# Patient Record
Sex: MALE | Race: WHITE | NOT HISPANIC OR LATINO | Employment: FULL TIME | URBAN - METROPOLITAN AREA
[De-identification: names, ages, dates, MRNs, and addresses within clinical notes are randomized per-mention and may not be internally consistent; named-entity substitution may affect disease eponyms.]

---

## 2022-06-21 ENCOUNTER — APPOINTMENT (OUTPATIENT)
Dept: GENERAL RADIOLOGY | Facility: HOSPITAL | Age: 52
End: 2022-06-21

## 2022-06-21 ENCOUNTER — HOSPITAL ENCOUNTER (EMERGENCY)
Facility: HOSPITAL | Age: 52
Discharge: HOME | End: 2022-06-21
Attending: EMERGENCY MEDICINE | Admitting: EMERGENCY MEDICINE

## 2022-06-21 ENCOUNTER — APPOINTMENT (OUTPATIENT)
Dept: CT IMAGING | Facility: HOSPITAL | Age: 52
End: 2022-06-21

## 2022-06-21 VITALS
SYSTOLIC BLOOD PRESSURE: 149 MMHG | HEIGHT: 67 IN | HEART RATE: 106 BPM | BODY MASS INDEX: 25.9 KG/M2 | TEMPERATURE: 98 F | DIASTOLIC BLOOD PRESSURE: 99 MMHG | OXYGEN SATURATION: 95 % | RESPIRATION RATE: 18 BRPM | WEIGHT: 165 LBS

## 2022-06-21 DIAGNOSIS — K62.5 RECTAL BLEEDING: Primary | ICD-10-CM

## 2022-06-21 DIAGNOSIS — E87.1 HYPONATREMIA: ICD-10-CM

## 2022-06-21 DIAGNOSIS — K52.9 CHRONIC DIARRHEA: ICD-10-CM

## 2022-06-21 DIAGNOSIS — K63.9 COLON WALL THICKENING: ICD-10-CM

## 2022-06-21 DIAGNOSIS — R55 NEAR SYNCOPE: ICD-10-CM

## 2022-06-21 LAB
ABO GROUP BLD: NORMAL
ALBUMIN SERPL-MCNC: 4.9 G/DL (ref 3.5–5.2)
ALBUMIN/GLOB SERPL: 2.1 G/DL
ALP SERPL-CCNC: 100 U/L (ref 39–117)
ALT SERPL W P-5'-P-CCNC: 17 U/L (ref 1–41)
ANION GAP SERPL CALCULATED.3IONS-SCNC: 11 MMOL/L (ref 5–15)
AST SERPL-CCNC: 25 U/L (ref 1–40)
BASOPHILS # BLD AUTO: 0.05 10*3/MM3 (ref 0–0.2)
BASOPHILS NFR BLD AUTO: 0.9 % (ref 0–1.5)
BILIRUB SERPL-MCNC: 0.7 MG/DL (ref 0–1.2)
BLD GP AB SCN SERPL QL: NEGATIVE
BUN SERPL-MCNC: 9 MG/DL (ref 6–20)
BUN/CREAT SERPL: 11.4 (ref 7–25)
CALCIUM SPEC-SCNC: 9.7 MG/DL (ref 8.6–10.5)
CHLORIDE SERPL-SCNC: 91 MMOL/L (ref 98–107)
CO2 SERPL-SCNC: 25 MMOL/L (ref 22–29)
CREAT SERPL-MCNC: 0.79 MG/DL (ref 0.76–1.27)
DEPRECATED RDW RBC AUTO: 41.3 FL (ref 37–54)
EGFRCR SERPLBLD CKD-EPI 2021: 106.2 ML/MIN/1.73
EOSINOPHIL # BLD AUTO: 0.13 10*3/MM3 (ref 0–0.4)
EOSINOPHIL NFR BLD AUTO: 2.4 % (ref 0.3–6.2)
ERYTHROCYTE [DISTWIDTH] IN BLOOD BY AUTOMATED COUNT: 12.4 % (ref 12.3–15.4)
GLOBULIN UR ELPH-MCNC: 2.3 GM/DL
GLUCOSE SERPL-MCNC: 97 MG/DL (ref 65–99)
HCT VFR BLD AUTO: 35.3 % (ref 37.5–51)
HGB BLD-MCNC: 12.5 G/DL (ref 13–17.7)
HOLD SPECIMEN: NORMAL
HOLD SPECIMEN: NORMAL
IMM GRANULOCYTES # BLD AUTO: 0.04 10*3/MM3 (ref 0–0.05)
IMM GRANULOCYTES NFR BLD AUTO: 0.7 % (ref 0–0.5)
LYMPHOCYTES # BLD AUTO: 0.64 10*3/MM3 (ref 0.7–3.1)
LYMPHOCYTES NFR BLD AUTO: 12 % (ref 19.6–45.3)
MCH RBC QN AUTO: 32.5 PG (ref 26.6–33)
MCHC RBC AUTO-ENTMCNC: 35.4 G/DL (ref 31.5–35.7)
MCV RBC AUTO: 91.7 FL (ref 79–97)
MONOCYTES # BLD AUTO: 0.8 10*3/MM3 (ref 0.1–0.9)
MONOCYTES NFR BLD AUTO: 15 % (ref 5–12)
NEUTROPHILS NFR BLD AUTO: 3.68 10*3/MM3 (ref 1.7–7)
NEUTROPHILS NFR BLD AUTO: 69 % (ref 42.7–76)
NRBC BLD AUTO-RTO: 0 /100 WBC (ref 0–0.2)
NT-PROBNP SERPL-MCNC: 79.6 PG/ML (ref 0–900)
PLATELET # BLD AUTO: 214 10*3/MM3 (ref 140–450)
PMV BLD AUTO: 8.6 FL (ref 6–12)
POTASSIUM SERPL-SCNC: 4.4 MMOL/L (ref 3.5–5.2)
PROT SERPL-MCNC: 7.2 G/DL (ref 6–8.5)
RBC # BLD AUTO: 3.85 10*6/MM3 (ref 4.14–5.8)
RH BLD: POSITIVE
SODIUM SERPL-SCNC: 127 MMOL/L (ref 136–145)
T&S EXPIRATION DATE: NORMAL
TROPONIN T SERPL-MCNC: <0.01 NG/ML (ref 0–0.03)
WBC NRBC COR # BLD: 5.34 10*3/MM3 (ref 3.4–10.8)
WHOLE BLOOD HOLD COAG: NORMAL
WHOLE BLOOD HOLD SPECIMEN: NORMAL

## 2022-06-21 PROCEDURE — 74177 CT ABD & PELVIS W/CONTRAST: CPT

## 2022-06-21 PROCEDURE — 99282 EMERGENCY DEPT VISIT SF MDM: CPT

## 2022-06-21 PROCEDURE — 93005 ELECTROCARDIOGRAM TRACING: CPT | Performed by: EMERGENCY MEDICINE

## 2022-06-21 PROCEDURE — 86900 BLOOD TYPING SEROLOGIC ABO: CPT

## 2022-06-21 PROCEDURE — 83880 ASSAY OF NATRIURETIC PEPTIDE: CPT | Performed by: EMERGENCY MEDICINE

## 2022-06-21 PROCEDURE — 71046 X-RAY EXAM CHEST 2 VIEWS: CPT

## 2022-06-21 PROCEDURE — 84484 ASSAY OF TROPONIN QUANT: CPT | Performed by: EMERGENCY MEDICINE

## 2022-06-21 PROCEDURE — 86900 BLOOD TYPING SEROLOGIC ABO: CPT | Performed by: EMERGENCY MEDICINE

## 2022-06-21 PROCEDURE — 85025 COMPLETE CBC W/AUTO DIFF WBC: CPT | Performed by: EMERGENCY MEDICINE

## 2022-06-21 PROCEDURE — 86850 RBC ANTIBODY SCREEN: CPT | Performed by: EMERGENCY MEDICINE

## 2022-06-21 PROCEDURE — 86901 BLOOD TYPING SEROLOGIC RH(D): CPT | Performed by: EMERGENCY MEDICINE

## 2022-06-21 PROCEDURE — 36415 COLL VENOUS BLD VENIPUNCTURE: CPT

## 2022-06-21 PROCEDURE — 86901 BLOOD TYPING SEROLOGIC RH(D): CPT

## 2022-06-21 PROCEDURE — 25010000002 IOPAMIDOL 61 % SOLUTION: Performed by: EMERGENCY MEDICINE

## 2022-06-21 PROCEDURE — 80053 COMPREHEN METABOLIC PANEL: CPT | Performed by: EMERGENCY MEDICINE

## 2022-06-21 RX ORDER — SODIUM CHLORIDE 0.9 % (FLUSH) 0.9 %
10 SYRINGE (ML) INJECTION AS NEEDED
Status: DISCONTINUED | OUTPATIENT
Start: 2022-06-21 | End: 2022-06-22 | Stop reason: HOSPADM

## 2022-06-21 RX ADMIN — IOPAMIDOL 85 ML: 612 INJECTION, SOLUTION INTRAVENOUS at 21:29

## 2022-06-22 LAB
HOLD SPECIMEN: NORMAL
QT INTERVAL: 406 MS
QTC INTERVAL: 479 MS

## 2022-06-22 NOTE — ED PROVIDER NOTES
Subjective   Pt is a 52 yo male presenting to ED with complaints of rectal bleeding and syncope. PMHx significant for HTN, Hemorrhoids, chronic diarrhea and Anxiety. Pt reports having some bright red blood in stool for the past 2 days. He reports chronic diarrhea and and known hemorrhoids. He denies pain with bowel movement. He does not take blood thinners. He reports after having his diarrhea earlier today he began to get sweaty and then had a near syncopal episode. He denies full LOC and denies falling or injury. He reports he has chronic chest tightness but denies new or worse than baseline. He denies headache, current dizziness, cough, fever, chills, vomiting or urinary sx. He denies known cardiac disease. He states his PCP at the [facility] follows him and he has been referred for a colonoscopy. He reports issues with recurrent low sodium levels. He reports prior cardiac workup for chest pain / recurrent syncope while having bowel movements with ECHO, holter etc and he reports normal testing. He denies new or change in meds. He denies tobacco, drug or ETOH use.       History provided by:  Patient and medical records      Review of Systems   Constitutional: Positive for diaphoresis. Negative for chills and fever.   HENT: Negative for congestion, sore throat and trouble swallowing.    Eyes: Negative for visual disturbance.   Respiratory: Negative for cough and shortness of breath.    Cardiovascular: Negative for chest pain and leg swelling.   Gastrointestinal: Positive for abdominal pain (earlier with diarrhea, resolved), blood in stool and diarrhea. Negative for constipation, nausea and vomiting.   Genitourinary: Negative for difficulty urinating, dysuria and flank pain.   Musculoskeletal: Negative for arthralgias and back pain.   Skin: Negative.  Negative for rash and wound.   Allergic/Immunologic: Negative.    Neurological: Positive for syncope (near). Negative for dizziness, weakness, numbness and headaches.    Psychiatric/Behavioral: Negative for confusion.   All other systems reviewed and are negative.      No past medical history on file.    Allergies   Allergen Reactions   • Erythromycin Nausea And Vomiting   • Penicillins Hives       No past surgical history on file.    No family history on file.    Social History     Socioeconomic History   • Marital status: Single           Objective   Physical Exam  Vitals and nursing note reviewed.   Constitutional:       Appearance: He is well-developed.   HENT:      Head: Atraumatic.      Nose: Nose normal.   Eyes:      General: Lids are normal.      Conjunctiva/sclera: Conjunctivae normal.      Pupils: Pupils are equal, round, and reactive to light.   Cardiovascular:      Rate and Rhythm: Normal rate and regular rhythm.      Heart sounds: Normal heart sounds.   Pulmonary:      Effort: Pulmonary effort is normal.      Breath sounds: Normal breath sounds.   Abdominal:      General: There is no distension.      Palpations: Abdomen is soft.      Tenderness: There is no abdominal tenderness. There is no guarding or rebound.   Musculoskeletal:         General: No tenderness or deformity. Normal range of motion.      Cervical back: Normal range of motion and neck supple.   Skin:     General: Skin is warm and dry.   Neurological:      Mental Status: He is alert and oriented to person, place, and time.      Sensory: No sensory deficit.   Psychiatric:         Mood and Affect: Mood is anxious.         Speech: Speech normal.         Behavior: Behavior normal.         Procedures           ED Course  ED Course as of 06/21/22 2250   Tue Jun 21, 2022 2201 Hemoglobin(!): 12.5 [RT]   2201 Hematocrit(!): 35.3 [RT]   2201 Sodium(!): 127 [RT]      ED Course User Index  [RT] Jessie Jaime PA      Re-examined patient and discussed results. Na improved from 124 on 6/16 to 127. Discussed CT findings and need for close GI f/u. He reports his PCP has scheduled a colonoscopy but it is not till  August and he is unsure who it is with. Will provide GI on call follow up from ED. Went over new / worse sx to return to ED.     Reviewed old records.     Recent Results (from the past 24 hour(s))   Comprehensive Metabolic Panel    Collection Time: 06/21/22  8:10 PM    Specimen: Blood   Result Value Ref Range    Glucose 97 65 - 99 mg/dL    BUN 9 6 - 20 mg/dL    Creatinine 0.79 0.76 - 1.27 mg/dL    Sodium 127 (L) 136 - 145 mmol/L    Potassium 4.4 3.5 - 5.2 mmol/L    Chloride 91 (L) 98 - 107 mmol/L    CO2 25.0 22.0 - 29.0 mmol/L    Calcium 9.7 8.6 - 10.5 mg/dL    Total Protein 7.2 6.0 - 8.5 g/dL    Albumin 4.90 3.50 - 5.20 g/dL    ALT (SGPT) 17 1 - 41 U/L    AST (SGOT) 25 1 - 40 U/L    Alkaline Phosphatase 100 39 - 117 U/L    Total Bilirubin 0.7 0.0 - 1.2 mg/dL    Globulin 2.3 gm/dL    A/G Ratio 2.1 g/dL    BUN/Creatinine Ratio 11.4 7.0 - 25.0    Anion Gap 11.0 5.0 - 15.0 mmol/L    eGFR 106.2 >60.0 mL/min/1.73   Type & Screen    Collection Time: 06/21/22  8:10 PM    Specimen: Blood   Result Value Ref Range    ABO Type O     RH type Positive     Antibody Screen Negative     T&S Expiration Date 6/24/2022 11:59:59 PM    Troponin    Collection Time: 06/21/22  8:10 PM    Specimen: Blood   Result Value Ref Range    Troponin T <0.010 0.000 - 0.030 ng/mL   BNP    Collection Time: 06/21/22  8:10 PM    Specimen: Blood   Result Value Ref Range    proBNP 79.6 0.0 - 900.0 pg/mL   Green Top (Gel)    Collection Time: 06/21/22  8:10 PM   Result Value Ref Range    Extra Tube Hold for add-ons.    Gold Top - SST    Collection Time: 06/21/22  8:10 PM   Result Value Ref Range    Extra Tube Hold for add-ons.    Light Blue Top    Collection Time: 06/21/22  8:10 PM   Result Value Ref Range    Extra Tube Hold for add-ons.    Lavender Top    Collection Time: 06/21/22  9:35 PM   Result Value Ref Range    Extra Tube hold for add-on    CBC Auto Differential    Collection Time: 06/21/22  9:35 PM    Specimen: Blood   Result Value Ref Range    WBC  5.34 3.40 - 10.80 10*3/mm3    RBC 3.85 (L) 4.14 - 5.80 10*6/mm3    Hemoglobin 12.5 (L) 13.0 - 17.7 g/dL    Hematocrit 35.3 (L) 37.5 - 51.0 %    MCV 91.7 79.0 - 97.0 fL    MCH 32.5 26.6 - 33.0 pg    MCHC 35.4 31.5 - 35.7 g/dL    RDW 12.4 12.3 - 15.4 %    RDW-SD 41.3 37.0 - 54.0 fl    MPV 8.6 6.0 - 12.0 fL    Platelets 214 140 - 450 10*3/mm3    Neutrophil % 69.0 42.7 - 76.0 %    Lymphocyte % 12.0 (L) 19.6 - 45.3 %    Monocyte % 15.0 (H) 5.0 - 12.0 %    Eosinophil % 2.4 0.3 - 6.2 %    Basophil % 0.9 0.0 - 1.5 %    Immature Grans % 0.7 (H) 0.0 - 0.5 %    Neutrophils, Absolute 3.68 1.70 - 7.00 10*3/mm3    Lymphocytes, Absolute 0.64 (L) 0.70 - 3.10 10*3/mm3    Monocytes, Absolute 0.80 0.10 - 0.90 10*3/mm3    Eosinophils, Absolute 0.13 0.00 - 0.40 10*3/mm3    Basophils, Absolute 0.05 0.00 - 0.20 10*3/mm3    Immature Grans, Absolute 0.04 0.00 - 0.05 10*3/mm3    nRBC 0.0 0.0 - 0.2 /100 WBC     Note: In addition to lab results from this visit, the labs listed above may include labs taken at another facility or during a different encounter within the last 24 hours. Please correlate lab times with ED admission and discharge times for further clarification of the services performed during this visit.    CT Abdomen Pelvis With Contrast   Final Result       1. Thick-walled appearance of the descending colon, perhaps due to   nondistention. Mild left colon inflammation is favored.   2. Scattered sigmoid diverticulosis without focal inflammation to   suggest diverticulitis.   3. No evidence of acute intra-abdominal or intrapelvic disease is seen   elsewhere.       This report was finalized on 6/21/2022 10:30 PM by Dr. Kali Antoine MD.          XR Chest 2 View   Final Result   No evidence of active chest disease.       This report was finalized on 6/21/2022 9:48 PM by Dr. Kali Antoine MD.            Vitals:    06/21/22 1909   BP: 149/99   BP Location: Left arm   Patient Position: Sitting   Pulse: 106   Resp: 18   Temp: 98 °F (36.7 °C)  "  TempSrc: Oral   SpO2: 95%   Weight: 74.8 kg (165 lb)   Height: 170.2 cm (67\")     Medications   sodium chloride 0.9 % flush 10 mL (has no administration in time range)   sodium chloride 0.9 % bolus 1,000 mL (has no administration in time range)   iopamidol (ISOVUE-300) 61 % injection 100 mL (85 mL Intravenous Given 6/21/22 2129)     ECG/EMG Results (last 24 hours)     Procedure Component Value Units Date/Time    ECG 12 Lead [[id number]] Collected: 06/21/22 2011     Updated: 06/21/22 2016        ECG 12 Lead   Preliminary Result             DISCHARGE    Patient discharged in stable condition.    Reviewed implications of results, diagnosis, meds, responsibility to follow up, warning signs and symptoms of possible worsening, potential complications and reasons to return to ER.    Patient/Family voiced understanding of above instructions.    Discussed plan for discharge, as there is no emergent indication for admission.  Pt/family is agreeable and understands need for follow up and possible repeat testing.  Pt/family is aware that discharge does not mean that nothing is wrong but that it indicates no emergency is currently present that requires admission and they must continue care with follow-up as given below or with a physician of their choice.     FOLLOW-UP  Lena Dunbar MD  [address] [facility]  [address]  [place] [id number] [phone]    Schedule an appointment as soon as possible for a visit       Missael Parrish MD  [address] [facility]  [address]  [place] [id number] [phone]    Schedule an appointment as soon as possible for a visit       [facility] Emergency Department  [address] [facility] [address] [phone]    If symptoms worsen         Medication List      No changes were made to your prescriptions during this visit.                                            MDM    Final diagnoses:   Rectal bleeding   Colon wall thickening   Hyponatremia "   Chronic diarrhea   Near syncope       ED Disposition  ED Disposition     ED Disposition   Discharge    Condition   Stable    Comment   --             Lena Dunbar MD  [address] [facility]  [address]  [place] [id number] [phone]    Schedule an appointment as soon as possible for a visit       Missael Parrish MD  [address]  [address]  [place] [id number]  [phone]    Schedule an appointment as soon as possible for a visit       [facility] Emergency Department  [address] [facility] [address] [phone]    If symptoms worsen         Medication List      No changes were made to your prescriptions during this visit.          Jessie Jaime PA  06/21/22 9188

## 2023-02-16 ENCOUNTER — HOSPITAL ENCOUNTER (EMERGENCY)
Facility: HOSPITAL | Age: 53
Discharge: HOME | End: 2023-02-16
Attending: EMERGENCY MEDICINE | Admitting: EMERGENCY MEDICINE
Payer: COMMERCIAL

## 2023-02-16 ENCOUNTER — APPOINTMENT (OUTPATIENT)
Dept: CARDIOLOGY | Facility: HOSPITAL | Age: 53
End: 2023-02-16
Payer: COMMERCIAL

## 2023-02-16 ENCOUNTER — APPOINTMENT (OUTPATIENT)
Dept: CT IMAGING | Facility: HOSPITAL | Age: 53
End: 2023-02-16
Payer: COMMERCIAL

## 2023-02-16 ENCOUNTER — APPOINTMENT (OUTPATIENT)
Dept: GENERAL RADIOLOGY | Facility: HOSPITAL | Age: 53
End: 2023-02-16
Payer: COMMERCIAL

## 2023-02-16 VITALS
OXYGEN SATURATION: 99 % | WEIGHT: 160.05 LBS | TEMPERATURE: 97.6 F | HEART RATE: 91 BPM | SYSTOLIC BLOOD PRESSURE: 133 MMHG | RESPIRATION RATE: 20 BRPM | BODY MASS INDEX: 24.26 KG/M2 | HEIGHT: 68 IN | DIASTOLIC BLOOD PRESSURE: 89 MMHG

## 2023-02-16 DIAGNOSIS — R03.0 ELEVATED BLOOD PRESSURE READING: ICD-10-CM

## 2023-02-16 DIAGNOSIS — F41.9 ANXIETY: ICD-10-CM

## 2023-02-16 DIAGNOSIS — R07.9 CHEST PAIN, UNSPECIFIED TYPE: Primary | ICD-10-CM

## 2023-02-16 LAB
ALBUMIN SERPL-MCNC: 4.9 G/DL (ref 3.5–5.2)
ALBUMIN/GLOB SERPL: 1.8 G/DL
ALP SERPL-CCNC: 87 U/L (ref 39–117)
ALT SERPL W P-5'-P-CCNC: 14 U/L (ref 1–41)
ANION GAP SERPL CALCULATED.3IONS-SCNC: 12 MMOL/L (ref 5–15)
AST SERPL-CCNC: 19 U/L (ref 1–40)
BASOPHILS # BLD AUTO: 0.05 10*3/MM3 (ref 0–0.2)
BASOPHILS NFR BLD AUTO: 0.8 % (ref 0–1.5)
BILIRUB SERPL-MCNC: 0.7 MG/DL (ref 0–1.2)
BUN SERPL-MCNC: 8 MG/DL (ref 6–20)
BUN/CREAT SERPL: 9.9 (ref 7–25)
CALCIUM SPEC-SCNC: 9.6 MG/DL (ref 8.6–10.5)
CHLORIDE SERPL-SCNC: 93 MMOL/L (ref 98–107)
CO2 SERPL-SCNC: 23 MMOL/L (ref 22–29)
CREAT SERPL-MCNC: 0.81 MG/DL (ref 0.76–1.27)
DEPRECATED RDW RBC AUTO: 42.1 FL (ref 37–54)
EGFRCR SERPLBLD CKD-EPI 2021: 104.8 ML/MIN/1.73
EOSINOPHIL # BLD AUTO: 0.09 10*3/MM3 (ref 0–0.4)
EOSINOPHIL NFR BLD AUTO: 1.5 % (ref 0.3–6.2)
ERYTHROCYTE [DISTWIDTH] IN BLOOD BY AUTOMATED COUNT: 12.6 % (ref 12.3–15.4)
FLUAV SUBTYP SPEC NAA+PROBE: NOT DETECTED
FLUBV RNA ISLT QL NAA+PROBE: NOT DETECTED
GEN 5 2HR TROPONIN T REFLEX: <6 NG/L
GLOBULIN UR ELPH-MCNC: 2.7 GM/DL
GLUCOSE SERPL-MCNC: 96 MG/DL (ref 65–99)
HCT VFR BLD AUTO: 38.1 % (ref 37.5–51)
HGB BLD-MCNC: 13.2 G/DL (ref 13–17.7)
HOLD SPECIMEN: NORMAL
IMM GRANULOCYTES # BLD AUTO: 0.03 10*3/MM3 (ref 0–0.05)
IMM GRANULOCYTES NFR BLD AUTO: 0.5 % (ref 0–0.5)
LIPASE SERPL-CCNC: 21 U/L (ref 13–60)
LYMPHOCYTES # BLD AUTO: 0.77 10*3/MM3 (ref 0.7–3.1)
LYMPHOCYTES NFR BLD AUTO: 12.9 % (ref 19.6–45.3)
MCH RBC QN AUTO: 31.7 PG (ref 26.6–33)
MCHC RBC AUTO-ENTMCNC: 34.6 G/DL (ref 31.5–35.7)
MCV RBC AUTO: 91.4 FL (ref 79–97)
MONOCYTES # BLD AUTO: 0.59 10*3/MM3 (ref 0.1–0.9)
MONOCYTES NFR BLD AUTO: 9.9 % (ref 5–12)
NEUTROPHILS NFR BLD AUTO: 4.43 10*3/MM3 (ref 1.7–7)
NEUTROPHILS NFR BLD AUTO: 74.4 % (ref 42.7–76)
NRBC BLD AUTO-RTO: 0 /100 WBC (ref 0–0.2)
NT-PROBNP SERPL-MCNC: 98.5 PG/ML (ref 0–900)
PLATELET # BLD AUTO: 262 10*3/MM3 (ref 140–450)
PMV BLD AUTO: 8.5 FL (ref 6–12)
POTASSIUM SERPL-SCNC: 4.2 MMOL/L (ref 3.5–5.2)
PROT SERPL-MCNC: 7.6 G/DL (ref 6–8.5)
QT INTERVAL: 380 MS
QTC INTERVAL: 499 MS
RBC # BLD AUTO: 4.17 10*6/MM3 (ref 4.14–5.8)
SARS-COV-2 RNA RESP QL NAA+PROBE: NOT DETECTED
SODIUM SERPL-SCNC: 128 MMOL/L (ref 136–145)
TROPONIN T DELTA: NORMAL
TROPONIN T SERPL HS-MCNC: <6 NG/L
WBC NRBC COR # BLD: 5.96 10*3/MM3 (ref 3.4–10.8)
WHOLE BLOOD HOLD COAG: NORMAL
WHOLE BLOOD HOLD SPECIMEN: NORMAL

## 2023-02-16 PROCEDURE — 85025 COMPLETE CBC W/AUTO DIFF WBC: CPT

## 2023-02-16 PROCEDURE — 83880 ASSAY OF NATRIURETIC PEPTIDE: CPT

## 2023-02-16 PROCEDURE — 36415 COLL VENOUS BLD VENIPUNCTURE: CPT

## 2023-02-16 PROCEDURE — 84484 ASSAY OF TROPONIN QUANT: CPT

## 2023-02-16 PROCEDURE — 87636 SARSCOV2 & INF A&B AMP PRB: CPT | Performed by: EMERGENCY MEDICINE

## 2023-02-16 PROCEDURE — 0 IOPAMIDOL PER 1 ML: Performed by: EMERGENCY MEDICINE

## 2023-02-16 PROCEDURE — 84484 ASSAY OF TROPONIN QUANT: CPT | Performed by: EMERGENCY MEDICINE

## 2023-02-16 PROCEDURE — 93971 EXTREMITY STUDY: CPT | Performed by: INTERNAL MEDICINE

## 2023-02-16 PROCEDURE — 93005 ELECTROCARDIOGRAM TRACING: CPT

## 2023-02-16 PROCEDURE — 71045 X-RAY EXAM CHEST 1 VIEW: CPT

## 2023-02-16 PROCEDURE — 80053 COMPREHEN METABOLIC PANEL: CPT

## 2023-02-16 PROCEDURE — 99284 EMERGENCY DEPT VISIT MOD MDM: CPT

## 2023-02-16 PROCEDURE — 93005 ELECTROCARDIOGRAM TRACING: CPT | Performed by: EMERGENCY MEDICINE

## 2023-02-16 PROCEDURE — 93971 EXTREMITY STUDY: CPT

## 2023-02-16 PROCEDURE — 83690 ASSAY OF LIPASE: CPT

## 2023-02-16 PROCEDURE — 71275 CT ANGIOGRAPHY CHEST: CPT

## 2023-02-16 RX ORDER — SODIUM CHLORIDE 0.9 % (FLUSH) 0.9 %
10 SYRINGE (ML) INJECTION AS NEEDED
Status: DISCONTINUED | OUTPATIENT
Start: 2023-02-16 | End: 2023-02-16 | Stop reason: HOSPADM

## 2023-02-16 RX ORDER — ASPIRIN 81 MG/1
324 TABLET, CHEWABLE ORAL ONCE
Status: COMPLETED | OUTPATIENT
Start: 2023-02-16 | End: 2023-02-16

## 2023-02-16 RX ADMIN — IOPAMIDOL 85 ML: 755 INJECTION, SOLUTION INTRAVENOUS at 18:21

## 2023-02-16 RX ADMIN — ASPIRIN 81 MG 324 MG: 81 TABLET ORAL at 17:22

## 2023-02-16 NOTE — ED PROVIDER NOTES
Subjective   History of Present Illness  Patient presents to the ED following chest pain that began yesterday.  He explains he has had chest pain for over 2 years however his symptoms have gotten worse over the past day. 2 years ago he went to the emergency department for similar symptoms and was diagnosed with a right bundle branch branch block.  He is complaining of intermittent midsternal pain radiating to his left shoulder.  Describing the pain as sharp.  Also saying he has pain that radiates down the left arm from the left shoulder.  He describes that pain is aching. Complains of associated SOB and lying down exacerbates his symptoms.     Patient also complains of R leg pain that began 2 months ago. He has had leg pain in the past which he attributed to a MSK etiology, however this feels different. He is having a burning pain from his hamstring down to his lower leg. He is able to ambulate, however he notes he has been less active than normal due to new responsibilities.  Denies swelling, redness, excessive warmth, cyanosis, coolness of extremity.     History provided by:  Patient and parent      Review of Systems    No past medical history on file.    Allergies   Allergen Reactions   • Erythromycin Nausea And Vomiting   • Penicillins Hives       No past surgical history on file.    No family history on file.    Social History     Socioeconomic History   • Marital status: Single           Objective   Physical Exam  Vitals and nursing note reviewed.   Constitutional:       General: He is not in acute distress.     Appearance: He is well-developed and normal weight. He is not ill-appearing or diaphoretic.   Cardiovascular:      Rate and Rhythm: Normal rate and regular rhythm.      Pulses:           Radial pulses are 2+ on the right side.        Dorsalis pedis pulses are 2+ on the right side.      Heart sounds: Normal heart sounds.   Pulmonary:      Effort: Pulmonary effort is normal.      Breath sounds: Normal  breath sounds.   Chest:      Chest wall: No tenderness.   Musculoskeletal:      Right lower leg: No swelling, deformity or tenderness. No edema.      Left lower leg: No swelling, deformity or tenderness. No edema.   Skin:     Capillary Refill: Capillary refill takes less than 2 seconds.   Neurological:      Mental Status: He is alert.         Procedures           ED Course  ED Course as of 02/16/23 1949   Thu Feb 16, 2023   1634 High Sensitivity Troponin T  First set of cardiac biomarkers negative. [RS]   1635 XR Chest 1 View  Personally reviewed single view the chest demonstrating no focal infiltrate.  See report for radiology for details. [RS]   1837 CT Angiogram Chest  Personally reviewed the CT of the chest demonstrating no evidence of pulmonary embolism. [RS]   1853 Patient updated on the findings thus far.  Questions answered.  No evidence of significant emergent finding thus far. [RS]   1853 Sodium(!): 128  Sodium consistent with prior levels. [RS]   1943 Duplex Venous Lower Extremity - Right CAR  Preliminary duplex discussed with the echo technician negative for deep venous thrombosis.  Normal compression.  See final report for details.  Some mild superficial changes noted. [RS]   1944 Patient with chest discomfort.  Negative cardiac biomarkers and EKGs x2.  We will discharge the patient home.  Significant anxiety involved.  However, I did stressed with the patient that we have evaluated for significant acute life-threatening conditions, but is not all inclusive and thus should follow up with his primary for further evaluation and management.  I reviewed the findings and plan with the patient and his mother that is currently at bedside I had a discussion with the patient/family regarding diagnosis, diagnostic results, treatment plan, and medications.  The patient/family indicated understanding of these instructions.  I spent adequate time at the bedside prior to discharge necessary to discuss the aftercare  instructions, giving patient education, providing explanations of the results of our evaluations/findings, and my decision making to assure that the patient/family understand the plan of care.  Time was allotted to answer questions at that time and throughout the ED course.  Emphasis was placed on timely follow-up after discharge.  I also discussed the potential for the development of an acute emergent condition requiring further evaluation, return to the ER, admission, or even surgical intervention. I discussed that we found nothing during the visit today indicating the need for further ER workup at this time, admission to the hospital, or the presence of an acute unstable medical condition.  I encouraged the patient to return to the emergency department immediately for ANY concerns, worsening, new complaints, or if symptoms persist and unable to seek follow-up in a timely fashion.  The patient/family expressed understanding and agreement with this plan.  [RS]      ED Course User Index  [RS] Jose Antonio Hall MD                      HEART Score: 2                      Medical Decision Making  Amount and/or Complexity of Data Reviewed  External Data Reviewed: labs, ECG and notes.  Labs: ordered. Decision-making details documented in ED Course.  Radiology: ordered. Decision-making details documented in ED Course.  ECG/medicine tests: ordered.      Risk  OTC drugs.  Prescription drug management.          Final diagnoses:   Chest pain, unspecified type   Elevated blood pressure reading   Anxiety       ED Disposition  ED Disposition     ED Disposition   Discharge    Condition   Stable    Comment   --             Lena Dunbar MD  [address] [facility]  [address]LO[address]  [facility] [address]  [phone]    Call       [facility] Emergency Department  [address] [facility] [address] [phone]    As needed, If symptoms worsen or ANY concerns.    [facility]  [address]  [place] [id number]  [phone]  Schedule an appointment as soon as possible for a visit   As soon as possible.         Medication List      No changes were made to your prescriptions during this visit.          Jose Antonio Hall MD  02/16/23 1483

## 2023-02-17 LAB
BH CV LOW VAS RIGHT GREATER SAPH BK VESSEL: 1
BH CV LOWER VASCULAR LEFT COMMON FEMORAL PHASIC: NORMAL
BH CV LOWER VASCULAR LEFT COMMON FEMORAL SPONT: NORMAL
BH CV LOWER VASCULAR RIGHT COMMON FEMORAL AUGMENT: NORMAL
BH CV LOWER VASCULAR RIGHT COMMON FEMORAL COMPRESS: NORMAL
BH CV LOWER VASCULAR RIGHT COMMON FEMORAL PHASIC: NORMAL
BH CV LOWER VASCULAR RIGHT COMMON FEMORAL SPONT: NORMAL
BH CV LOWER VASCULAR RIGHT DISTAL FEMORAL AUGMENT: NORMAL
BH CV LOWER VASCULAR RIGHT DISTAL FEMORAL COMPRESS: NORMAL
BH CV LOWER VASCULAR RIGHT DISTAL FEMORAL PHASIC: NORMAL
BH CV LOWER VASCULAR RIGHT DISTAL FEMORAL SPONT: NORMAL
BH CV LOWER VASCULAR RIGHT GASTRONEMIUS COMPRESS: NORMAL
BH CV LOWER VASCULAR RIGHT GREATER SAPH AK COMPRESS: NORMAL
BH CV LOWER VASCULAR RIGHT GREATER SAPH BK COMPRESS: NORMAL
BH CV LOWER VASCULAR RIGHT LESSER SAPH COMPRESS: NORMAL
BH CV LOWER VASCULAR RIGHT MID FEMORAL AUGMENT: NORMAL
BH CV LOWER VASCULAR RIGHT MID FEMORAL COMPRESS: NORMAL
BH CV LOWER VASCULAR RIGHT MID FEMORAL PHASIC: NORMAL
BH CV LOWER VASCULAR RIGHT MID FEMORAL SPONT: NORMAL
BH CV LOWER VASCULAR RIGHT PERONEAL COMPRESS: NORMAL
BH CV LOWER VASCULAR RIGHT POPLITEAL AUGMENT: NORMAL
BH CV LOWER VASCULAR RIGHT POPLITEAL COMPRESS: NORMAL
BH CV LOWER VASCULAR RIGHT POPLITEAL PHASIC: NORMAL
BH CV LOWER VASCULAR RIGHT POPLITEAL SPONT: NORMAL
BH CV LOWER VASCULAR RIGHT POSTERIOR TIBIAL COMPRESS: NORMAL
BH CV LOWER VASCULAR RIGHT PROFUNDA FEMORAL PHASIC: NORMAL
BH CV LOWER VASCULAR RIGHT PROFUNDA FEMORAL SPONT: NORMAL
BH CV LOWER VASCULAR RIGHT PROXIMAL FEMORAL AUGMENT: NORMAL
BH CV LOWER VASCULAR RIGHT PROXIMAL FEMORAL COMPRESS: NORMAL
BH CV LOWER VASCULAR RIGHT PROXIMAL FEMORAL PHASIC: NORMAL
BH CV LOWER VASCULAR RIGHT PROXIMAL FEMORAL SPONT: NORMAL
BH CV LOWER VASCULAR RIGHT SAPHENOFEMORAL JUNCTION AUGMENT: NORMAL
BH CV LOWER VASCULAR RIGHT SAPHENOFEMORAL JUNCTION COMPRESS: NORMAL
BH CV LOWER VASCULAR RIGHT SAPHENOFEMORAL JUNCTION PHASIC: NORMAL
BH CV LOWER VASCULAR RIGHT SAPHENOFEMORAL JUNCTION SPONT: NORMAL
BH CV VAS PRELIMINARY FINDINGS SCRIPTING: 1
MAXIMAL PREDICTED HEART RATE: 166 BPM
STRESS TARGET HR: 141 BPM

## 2023-02-21 LAB
QT INTERVAL: 416 MS
QTC INTERVAL: 514 MS

## 2023-03-05 ENCOUNTER — TELEPHONE (OUTPATIENT)
Dept: CARDIOLOGY | Facility: HOSPITAL | Age: 53
End: 2023-03-05
Payer: COMMERCIAL

## 2023-03-05 NOTE — TELEPHONE ENCOUNTER
Patient was referred to [facility] by the [facility]. Several attempts have been made to contact the patient with no success. Letter was mailed to patient to contact the office to schedule.

## 2023-06-11 ENCOUNTER — HOSPITAL ENCOUNTER (EMERGENCY)
Facility: HOSPITAL | Age: 53
Discharge: HOME | End: 2023-06-12
Attending: EMERGENCY MEDICINE | Admitting: EMERGENCY MEDICINE
Payer: COMMERCIAL

## 2023-06-11 ENCOUNTER — APPOINTMENT (OUTPATIENT)
Dept: CT IMAGING | Facility: HOSPITAL | Age: 53
End: 2023-06-11
Payer: COMMERCIAL

## 2023-06-11 DIAGNOSIS — F41.9 ANXIETY: Primary | ICD-10-CM

## 2023-06-11 DIAGNOSIS — R44.3 HALLUCINATIONS: ICD-10-CM

## 2023-06-11 DIAGNOSIS — Z72.820 SLEEP DEPRIVATION: ICD-10-CM

## 2023-06-11 DIAGNOSIS — F22 PARANOIA (PSYCHOSIS): ICD-10-CM

## 2023-06-11 DIAGNOSIS — F41.8 ANXIETY ABOUT HEALTH: ICD-10-CM

## 2023-06-11 LAB
ALBUMIN SERPL-MCNC: 4.7 G/DL (ref 3.5–5.2)
ALBUMIN/GLOB SERPL: 2 G/DL
ALP SERPL-CCNC: 93 U/L (ref 39–117)
ALT SERPL W P-5'-P-CCNC: 19 U/L (ref 1–41)
AMMONIA BLD-SCNC: 14 UMOL/L (ref 16–60)
AMPHET+METHAMPHET UR QL: NEGATIVE
AMPHETAMINES UR QL: NEGATIVE
ANION GAP SERPL CALCULATED.3IONS-SCNC: 11 MMOL/L (ref 5–15)
AST SERPL-CCNC: 22 U/L (ref 1–40)
BARBITURATES UR QL SCN: NEGATIVE
BASOPHILS # BLD AUTO: 0.05 10*3/MM3 (ref 0–0.2)
BASOPHILS NFR BLD AUTO: 0.8 % (ref 0–1.5)
BENZODIAZ UR QL SCN: POSITIVE
BILIRUB SERPL-MCNC: 0.6 MG/DL (ref 0–1.2)
BILIRUB UR QL STRIP: NEGATIVE
BUN SERPL-MCNC: 8 MG/DL (ref 6–20)
BUN/CREAT SERPL: 11.3 (ref 7–25)
BUPRENORPHINE SERPL-MCNC: NEGATIVE NG/ML
CALCIUM SPEC-SCNC: 9.5 MG/DL (ref 8.6–10.5)
CANNABINOIDS SERPL QL: NEGATIVE
CHLORIDE SERPL-SCNC: 93 MMOL/L (ref 98–107)
CLARITY UR: CLEAR
CO2 SERPL-SCNC: 25 MMOL/L (ref 22–29)
COCAINE UR QL: NEGATIVE
COLOR UR: YELLOW
CREAT SERPL-MCNC: 0.71 MG/DL (ref 0.76–1.27)
DEPRECATED RDW RBC AUTO: 41.4 FL (ref 37–54)
EGFRCR SERPLBLD CKD-EPI 2021: 109 ML/MIN/1.73
EOSINOPHIL # BLD AUTO: 0.07 10*3/MM3 (ref 0–0.4)
EOSINOPHIL NFR BLD AUTO: 1.2 % (ref 0.3–6.2)
ERYTHROCYTE [DISTWIDTH] IN BLOOD BY AUTOMATED COUNT: 12.3 % (ref 12.3–15.4)
ETHANOL BLD-MCNC: <10 MG/DL (ref 0–10)
FENTANYL UR-MCNC: NEGATIVE NG/ML
FLUAV RNA RESP QL NAA+PROBE: NOT DETECTED
FLUBV RNA RESP QL NAA+PROBE: NOT DETECTED
GLOBULIN UR ELPH-MCNC: 2.3 GM/DL
GLUCOSE SERPL-MCNC: 103 MG/DL (ref 65–99)
GLUCOSE UR STRIP-MCNC: NEGATIVE MG/DL
HCT VFR BLD AUTO: 38.7 % (ref 37.5–51)
HGB BLD-MCNC: 13.2 G/DL (ref 13–17.7)
HGB UR QL STRIP.AUTO: NEGATIVE
IMM GRANULOCYTES # BLD AUTO: 0.02 10*3/MM3 (ref 0–0.05)
IMM GRANULOCYTES NFR BLD AUTO: 0.3 % (ref 0–0.5)
KETONES UR QL STRIP: NEGATIVE
LEUKOCYTE ESTERASE UR QL STRIP.AUTO: NEGATIVE
LYMPHOCYTES # BLD AUTO: 0.64 10*3/MM3 (ref 0.7–3.1)
LYMPHOCYTES NFR BLD AUTO: 10.6 % (ref 19.6–45.3)
MCH RBC QN AUTO: 31.4 PG (ref 26.6–33)
MCHC RBC AUTO-ENTMCNC: 34.1 G/DL (ref 31.5–35.7)
MCV RBC AUTO: 91.9 FL (ref 79–97)
METHADONE UR QL SCN: NEGATIVE
MONOCYTES # BLD AUTO: 0.65 10*3/MM3 (ref 0.1–0.9)
MONOCYTES NFR BLD AUTO: 10.8 % (ref 5–12)
NEUTROPHILS NFR BLD AUTO: 4.61 10*3/MM3 (ref 1.7–7)
NEUTROPHILS NFR BLD AUTO: 76.3 % (ref 42.7–76)
NITRITE UR QL STRIP: NEGATIVE
NRBC BLD AUTO-RTO: 0 /100 WBC (ref 0–0.2)
OPIATES UR QL: NEGATIVE
OXYCODONE UR QL SCN: NEGATIVE
PCP UR QL SCN: NEGATIVE
PH UR STRIP.AUTO: 8 [PH] (ref 5–8)
PLATELET # BLD AUTO: 243 10*3/MM3 (ref 140–450)
PMV BLD AUTO: 8.1 FL (ref 6–12)
POTASSIUM SERPL-SCNC: 4.5 MMOL/L (ref 3.5–5.2)
PROPOXYPH UR QL: NEGATIVE
PROT SERPL-MCNC: 7 G/DL (ref 6–8.5)
PROT UR QL STRIP: NEGATIVE
QT INTERVAL: 422 MS
QTC INTERVAL: 507 MS
RBC # BLD AUTO: 4.21 10*6/MM3 (ref 4.14–5.8)
SARS-COV-2 RNA RESP QL NAA+PROBE: NOT DETECTED
SODIUM SERPL-SCNC: 129 MMOL/L (ref 136–145)
SP GR UR STRIP: 1.01 (ref 1–1.03)
TRICYCLICS UR QL SCN: NEGATIVE
TSH SERPL DL<=0.05 MIU/L-ACNC: 1.38 UIU/ML (ref 0.27–4.2)
UROBILINOGEN UR QL STRIP: NORMAL
WBC NRBC COR # BLD: 6.04 10*3/MM3 (ref 3.4–10.8)

## 2023-06-11 PROCEDURE — 93005 ELECTROCARDIOGRAM TRACING: CPT | Performed by: EMERGENCY MEDICINE

## 2023-06-11 PROCEDURE — 25010000002 LORAZEPAM PER 2 MG: Performed by: EMERGENCY MEDICINE

## 2023-06-11 PROCEDURE — 82077 ASSAY SPEC XCP UR&BREATH IA: CPT | Performed by: EMERGENCY MEDICINE

## 2023-06-11 PROCEDURE — 82140 ASSAY OF AMMONIA: CPT | Performed by: EMERGENCY MEDICINE

## 2023-06-11 PROCEDURE — 80050 GENERAL HEALTH PANEL: CPT | Performed by: EMERGENCY MEDICINE

## 2023-06-11 PROCEDURE — 70450 CT HEAD/BRAIN W/O DYE: CPT

## 2023-06-11 PROCEDURE — 99284 EMERGENCY DEPT VISIT MOD MDM: CPT

## 2023-06-11 PROCEDURE — 81003 URINALYSIS AUTO W/O SCOPE: CPT | Performed by: EMERGENCY MEDICINE

## 2023-06-11 PROCEDURE — 80307 DRUG TEST PRSMV CHEM ANLYZR: CPT | Performed by: EMERGENCY MEDICINE

## 2023-06-11 PROCEDURE — 36415 COLL VENOUS BLD VENIPUNCTURE: CPT

## 2023-06-11 PROCEDURE — 96374 THER/PROPH/DIAG INJ IV PUSH: CPT

## 2023-06-11 PROCEDURE — 87636 SARSCOV2 & INF A&B AMP PRB: CPT | Performed by: EMERGENCY MEDICINE

## 2023-06-11 RX ORDER — FLUTICASONE PROPIONATE 50 MCG
SPRAY, SUSPENSION (ML) NASAL
COMMUNITY

## 2023-06-11 RX ORDER — LORATADINE 10 MG/1
TABLET ORAL
COMMUNITY

## 2023-06-11 RX ORDER — LISINOPRIL 40 MG/1
40 TABLET ORAL DAILY
COMMUNITY

## 2023-06-11 RX ORDER — LORAZEPAM 2 MG/ML
0.5 INJECTION INTRAMUSCULAR ONCE
Status: COMPLETED | OUTPATIENT
Start: 2023-06-11 | End: 2023-06-11

## 2023-06-11 RX ADMIN — LORAZEPAM 0.5 MG: 2 INJECTION INTRAMUSCULAR; INTRAVENOUS at 10:46

## 2023-06-11 NOTE — ED PROVIDER NOTES
"Subjective   History of Present Illness  Santo Singh is a 52-year-old male with a history of hypertension, hyponatremia, unsteady gait who presents to the ER for evaluation of dizziness.  Patient states that he has had intermittent dizziness and \" something in my brain is just not firing, something is wrong with my brain\" over the last few years but states that has increased over the last few days.  Patient does endorse that he currently is the sole caregiver for his mother and feels like managing her health and his health is causing physical changes of his body. He endorses he is \"scared to drive because I feel like I might die.\" Mother at bedside states the patient has been increasingly anxious lately. Patient does mention that he drinks approximately 3 beers daily but states they are 12 oz 9%. He continues to state that he is worried \"something is wrong with me.\" He states he does not recall telling the triage nurse that he was \"hearing voices.\" Patient does note that he has a history of low sodium and is worried he may have subclinical seizures. Patient denies changes in vision, LOC, headache, chest pain, nausea, vomiting.        History provided by:  Parent and patient   used: No      Review of Systems   Constitutional:  Negative for chills, fatigue and fever.   HENT:  Negative for congestion and sore throat.    Respiratory:  Negative for shortness of breath.    Cardiovascular:  Negative for chest pain.   Gastrointestinal:  Negative for abdominal pain.   Genitourinary:  Negative for dysuria.   Musculoskeletal:  Negative for back pain.   Skin:  Negative for rash.   Neurological:  Positive for tremors and weakness. Negative for headaches.   Psychiatric/Behavioral:  Negative for agitation and confusion. The patient is nervous/anxious.    All other systems reviewed and are negative.    Past Medical History:   Diagnosis Date    Hypertension     Hyponatremia        Allergies   Allergen " Reactions    Erythromycin Nausea And Vomiting    Penicillins Hives       History reviewed. No pertinent surgical history.    History reviewed. No pertinent family history.    Social History     Socioeconomic History    Marital status: Single   Tobacco Use    Smoking status: Never    Smokeless tobacco: Never   Substance and Sexual Activity    Alcohol use: Defer    Drug use: Never    Sexual activity: Defer           Objective   Physical Exam  Vitals and nursing note reviewed.   Constitutional:       Appearance: Normal appearance.   HENT:      Head: Normocephalic and atraumatic.      Mouth/Throat:      Mouth: Mucous membranes are moist.      Pharynx: Oropharynx is clear.   Cardiovascular:      Rate and Rhythm: Normal rate and regular rhythm.      Pulses: Normal pulses.      Heart sounds: Normal heart sounds.   Pulmonary:      Effort: Pulmonary effort is normal.      Breath sounds: Normal breath sounds.   Abdominal:      Palpations: Abdomen is soft.      Tenderness: There is no abdominal tenderness.   Musculoskeletal:         General: Normal range of motion.      Cervical back: Normal range of motion and neck supple.   Skin:     General: Skin is warm and dry.      Capillary Refill: Capillary refill takes less than 2 seconds.   Neurological:      General: No focal deficit present.      Mental Status: He is alert and oriented to person, place, and time.   Psychiatric:         Attention and Perception: Attention normal.         Mood and Affect: Mood is anxious. Affect is tearful.         Speech: Speech is tangential.         Thought Content: Thought content is paranoid.       Procedures           ED Course  ED Course as of 06/11/23 1718   Sun Jun 11, 2023   1104 Ethanol: <10 [MA]   1104 Urinalysis With Microscopic If Indicated (No Culture) - Urine, Clean Catch  negative [MA]   1105 Sodium(!): 129  Sodium improved from patient's baseline [MA]   1105 Urine Drug Screen - Urine, Clean Catch(!)  Positive for benzos, patient was  given Ativan here in the emergency department [MA]   1107 TSH Baseline: 1.380 [MA]   1111 COVID PRE-OP / PRE-PROCEDURE SCREENING ORDER (NO ISOLATION) - Swab, Nasopharynx  negative [MA]   1126 Fentanyl, Urine: Negative [MA]   1130 ENTERING INTO OBS STATUS. Patient stable at this time. Given patient presentation and concern for psychosis, hallucinations, and increased anxiety psychiatry recommended the patient be admitted to a inpatient unit. Referrals to psychiatric facilities were sent, waiting for patient to be accepted to facility, patient is agreeable to care plan.  [MA]      ED Course User Index  [MA] Lakesha Domínguez PA-C                                           Medical Decision Making    Santo Singh  is a 52 yrs old male  presents today for multiple complaints.  Upon arrival to the ER patient was afebrile, nontoxic-appearing, hemodynamically stable.  Physical exam revealed a anxious appearing patient that is tearful, tangential, paranoid, clear lung sounds bilaterally, neurovascularly intact.    Based on his history and physical exam, my differential diagnosis included several life threatening conditions including alcohol intoxication, metabolic abnormality, electrolyte abnormality, illicit drug use, mood disorder, acute psychosis, arrhythmia. Ruling out the most morbid conditions drove my clinical assessment.     In order to fully explore differential these tests and treatments were ordered.    Orders Placed This Encounter      COVID PRE-OP / PRE-PROCEDURE SCREENING ORDER (NO ISOLATION) - Swab, Nasopharynx      COVID-19 and FLU A/B PCR - Swab, Nasopharynx      Comprehensive Metabolic Panel      Urinalysis With Microscopic If Indicated (No Culture) - Urine, Clean Catch      Urine Drug Screen - Urine, Clean Catch      Ethanol      TSH      Ammonia      CBC Auto Differential      Fentanyl, Urine - Urine, Clean Catch      Psych / Access Consult      ECG 12 Lead Altered Mental Status      CBC & Differential      Medications  LORazepam (ATIVAN) injection 0.5 mg (0.5 mg Intravenous Given 6/11/23 1046)     Old records for this patient including previous outpatient visits were reviewed and found to be pertinent.  Records review indicates that patient has chronically low sodium and is followed carefully by his primary care doctor.  Patient was seen by primary care on 3/28/2023 and notes concerns for anxiety.     It should be noted that his chronic conditions includes hypertension, hyponatremia, which currently is not at goal therapy.  This complicates his clinical picture because it may be contributing to current presentation.   Additional history was provided by family at bedside.    I interpreted and clinically used the EKG prior to its official reading.  Sinus rhythm with bundle branch block on my read. Please see the interpretation for final read.   Lab results were reviewed independently and can be interpreted as ethanol less than 10, UA negative, sodium 129 which is improved from patient's baseline, UDS positive for benzos which is consistent with the Ativan the patient was given here in the emergency department.    Complicating his clinical picture is the social fact that he is the sole Caregiver for his mother who has significant dementia and patient currently is not caring for himself which puts him at higher risk for treatment failure and subsequent morbidity. We discussed having family come care for the patient's mother while the patient receives inpatient psychiatric treatment.    I had a interactive discussion with psychiatry regarding the patient's current presentation and concern for hallucinations, paranoia, increased phobias.  After evaluating the patient psychiatry ultimately felt the patient would benefit from inpatient admission to a psychiatric facility.  Patient was agreeable to care plan and referrals were sent to multiple outpatient facilities.     Ultimately, this patient was signed out to the [facility]  provider. Care was transferred to Dr. Sandhu pending psychiatric placement. Patient was stable prior to discharge.          Problems Addressed:  Anxiety: complicated acute illness or injury  Hallucinations: complicated acute illness or injury  Paranoia (psychosis): complicated acute illness or injury    Amount and/or Complexity of Data Reviewed  Labs: ordered. Decision-making details documented in ED Course.  Radiology: ordered.  ECG/medicine tests: ordered.    Risk  Prescription drug management.        Final diagnoses:   Anxiety   Hallucinations   Paranoia (psychosis)       ED Disposition  ED Disposition       None            No follow-up provider specified.       Medication List      No changes were made to your prescriptions during this visit.            Lakesha Domínguez PA-C  06/11/23 1814

## 2023-06-11 NOTE — CONSULTS
"Santo Locoaxel  1970      Race/Ethnicity: White or   Martial Status: Single  Guardian Name/Contact/etc: self  Pt Lives With:  elderly mother - he is her caregiver  Occupation: unemployed  Appearance: clean and casually dressed, appropriate       Time Called for Assessment: 11:57    Assessment Start and End: 12:00-12:35    Orientation: alert and oriented to person, place, and time     Is patient agreeable to treatment? Yes    Attention and Cooperation: Normal and Cooperative    Presenting Problems: Patient reports to the ED for dizziness and while at ED reports \"I am having issues with putting things in order and making decisions, it is like something is not firing in my brain.\" Patient is visibly anxious with shaky voice and trembling. Patient is tearful as he explains he is the sole caregiver for his elderly mother with dementia (has a brother but does not live locally). Patient describes his typical day of caregiving and reports he has lost all interest in doing any other ADLs outside of caring for his mother and reports he just lays in bed all day listening for his mom. Patient reports he has auditory hallucinations and can hear \"conversations\" but isn't sure if these are real. Patient reports visual hallucinations with \"seeing things move when they shouldn't\". Patient reports disturbed sleep with average of 2 hours of sleep per night. Patient reports he has isolated himself since caring for his mother. Patient reports a death wish and reports \"things would just be easier if I were dead\", he reports these thoughts happening today. Patient reports he does not have a plan or intent for suicide and does not want to kill himself although sometimes it feels as if being dead would be easier. Patient reports he drinks 3 beers a day but denies any other substance use.     Mood: depressed and anxious    Affect:  tearful and flat    Speech: shaky/trembling voice    Eye Contact: Poor    Psychomotor Movement: " Restless    Depression: 10     Anxiety: 10    Sleep: Poor     Appetite: Fair     Delusions: none displayed during assessment.      Hallucinations: Auditory and Visual     Homicidal Ideations: Absent     Current Stressors: family problems and limited support system     Housing Instability and/or Utility Needs: No    Food Insecurity: No    Transportation Needs: No    Established/Current Mental Healthcare/Services: none     Current Psychiatric Medications: none, patient reports PCP prescribed antidepressant but he doesn't take it.      Hx of Psychiatric or Detox Hospitalizations:  No    Most recent inpatient admission: N/A      [place]-SUICIDE SEVERITY RATING SCALE  Psychiatric Inpatient Setting - Discharge Screener    Ask questions that are bold and underlined Discharge   Ask Questions 1 and 2 YES NO   Wish to be Dead:   Person endorses thoughts about a wish to be dead or not alive anymore, or wish to fall asleep and not wake up.  While you were here in the hospital, have you wished you were dead or wished you could go to sleep and not wake up? x    Suicidal Thoughts:   General non-specific thoughts of wanting to end one's life/die by suicide, “I've thought about killing myself” without general thoughts of ways to kill oneself/associated methods, intent, or plan.   While you were here in the hospital, have you actually had thoughts about killing yourself?   x   If YES to 2, ask questions 3, 4, 5, and 6.  If NO to 2, go directly to question 6   3) Suicidal Thoughts with Method (without Specific Plan or Intent to Act):   Person endorses thoughts of suicide and has thought of a least one method during the assessment period. This is different than a specific plan with time, place or method details worked out. “I thought about taking an overdose but I never made a specific plan as to when where or how I would actually do it….and I would never go through with it.”   Have you been thinking about how you might kill yourself?  "     4) Suicidal Intent (without Specific Plan):   Active suicidal thoughts of killing oneself and patient reports having some intent to act on such thoughts, as opposed to “I have the thoughts but I definitely will not do anything about them.”   Have you had these thoughts and had some intention of acting on them or do you have some intention of acting on them after you leave the hospital?      5) Suicide Intent with Specific Plan:   Thoughts of killing oneself with details of plan fully or partially worked out and person has some intent to carry it out.   Have you started to work out or worked out the details of how to kill yourself either for while you were here in the hospital or for after you leave the hospital? Do you intend to carry out this plan?        6) Suicide Behavior    While you were here in the hospital, have you done anything, started to do anything, or prepared to do anything to end your life?    Examples: Took pills, cut yourself, tried to hang yourself, took out pills but didn't swallow any because you changed your mind or someone took them from you, collected pills, secured a means of obtaining a gun, gave away valuables, wrote a will or suicide note, etc.  x     Suicidal: Present, without plan or intent    Previous Attempts:  none    Most Recent Attempt: N/A    PSYCHOSOCIAL HISTORY    Highest Level of Education: high school diploma/GED     Family Hx of Mental Health/Substance Abuse: Yes, describe: mother has dementia and patient reports \"My aunt's and father's side of the family have mental health problems\" but wouldn't elaborate when prompted by therapist.     Patient Trauma/Abuse History: Further details: does not disclose       Does this require reporting: N/A    Legal History / History of Violence: None known     Experience with Interpersonal Violence: No     History of Inappropriate Sexual Behavior: No    SUBSTANCE USE HISTORY: Patient reports \"I used to be a heavy drinker in my 20s and " "30s but now I only drink 3 beers a day\". Patient denies use of other substances.         DATA:   This therapist received a call from [facility] staff for a behavioral health consult.  The patient is agreeable to speak with the behavioral health team.  Met with patient at bedside. Patient is not under 1:1 security monitoring.  The attending treatment team is TANIKA Chou and Lakesha PRASAD, Provider.    Patient presents today with chief compliant of hallucinations, anxiety, and depression.      Patient reports to the ED for dizziness and while at ED reports \"I am having issues with putting things in order and making decisions, it is like something is not firing in my brain.\" Patient is visibly anxious with shaky voice and trembling. Patient is tearful as he explains he is the sole caregiver for his elderly mother with dementia (has a brother but does not live locally). Patient describes his typical day of caregiving and reports he has lost all interest in doing any other ADLs outside of caring for his mother and reports he just lays in bed all day listening for his mom. Patient reports he has auditory hallucinations and can hear \"conversations\" but isn't sure if these are real. Patient reports visual hallucinations with \"seeing things move when they shouldn't\". Patient reports disturbed sleep with average of 2 hours of sleep per night. Patient reports he has isolated himself since caring for his mother. Patient reports a death wish and reports \"things would just be easier if I were dead\", he reports these thoughts happening today. Patient reports he does not have a plan or intent for suicide and does not want to kill himself although sometimes it feels as if being dead would be easier. Patient reports he drinks 3 beers a day but denies any other substance use.     Safety plan of report to nearest hospital, or call police/911 if feeling unsafe, if having suicidal or homicidal thoughts, or if in emergent need of " medications verbally reviewed with patient during assessment and suicide prevention/crisis hotlines verbally reviewed with patient during assessment.  Patient during assessment verbally agreed to safety plan. Patient reports to be agreeable for treatment recommendations.     ASSESSMENT:    Therapist consulted with patient and clinical descriptors are documented above.  Therapist completed CSSRS with patient for suicide risk assessment.  The results of patient’s CSSRS documented above. The patient's displays fair insight, fair impulse control and judgement appears fair.     PLAN:    At this time, therapist recommends inpatient treatment based upon the above assessment.  Therapist collaborated with the treatment team (Effie RN and Lakesha PRASAD, Provider) who agree to adopt the recommendations.  Therapist discussed recommendations with patient and/or patient support systems, and patient is agreeable to the plan.  Patient is agreeable for referrals to be sent to facilities and agencies for treatment.    DISPOSITION PLAN TIMELINE:    12:45: Therapist spoke with Lakesha PRASAD about evaluation and she is agreeable with plan for inpatient treatment. The patient does want to go to inpatient treatment but is the sole caregiver for his mother until his brother comes in to town tomorrow. Therapist asked Lakesha what would need to be done at this point and she reports she is talking with her attending and will give therapist a call back.     14:57: Therapist received a call from Lakesha PRASAD that patient's mother would be staying with a neighbor until his brother arrives to town to care for her.     15:10: Therapist faxed referral to [facility], and [facility].    16:30: Therapist spoke with Herson at [facility] and he reports that the referral is being reviewed at this time and he will call back shortly.     16:45:  at [facility] reports that referral is being reviewed.     17:00: Therapist received call from  "TANIKA Chou that [facility] called and patient was denied due to his dizziness and \"memory problems\".         Disposition planning will be completed by ANGELINA Canales due to shift change.         Joanne Nance, CSW, MSW        "

## 2023-06-11 NOTE — ED NOTES
18:00 Therapist received patient from day shift to follow up on placement. Patient requested inpatient close to [place]. Day Shift therapist sent referrals to [facility] and [facility]. [facility] had denied per day shift note. [facility] is reviewing per day shift note    19:00 Therapist contacted [facility] to follow up on referral. Intake reported they had not received a referral on patient. Therapist re faxed referral to [phone].    19:45 [facility] Called Therapist and requested notes be re faxed as during review they needed to make sure patient was medically cleared. Therapist re faxed notes for referral.     20:00 Therapist contacted Effie Alcantar RN for patient to provide update on referral placement. RN reported that patient is medically cleared .     20:04 Therapist contacted [facility] and spoke with Carissa and reported patient is medically cleared.     21:14 TANIKA Chou contacted Therapist and reported that Patient's CT was normal    21:16 Therapist contacted [facility] and spoke with Carissa and provided her with RN direct line for medical questions. [phone].    21:20 Therapist contacted [facility] for update on referral status.and spoke with Fabienne. She did receive referral and will review as soon as she can.    22:43 Therapist was contacted by Fabienne at [facility] to get phone numbers for RN to do Patient assessment. Therapist provided phone number to JOHN Olivia.     23:59 Therapist contacted  Corwin Ferrari to confirm [facility] reaching patient for Phone assessment. Therapist was told that Patient is speaking to [facility] intake at this time.     0:18 Fabienne from [facility] contacted Therapist and report Dr. Grey declined to accept patient as patient is not currently SI or HI.    0:19 Therapist spoke with TANIKA Ferrari and Dr. Sandhu regarding patient being denied to two placements. [facility] is wanting all new labs drawn and was reported  janki Ferrer that no one will review until the AM day shift. Dr Sandhu and Therapist planned for therapist to speak with patient and discuss options.    0:23 Therapist spoke with patient. Patient denies any SI/HI. Patient is willing to go to inpatient. Therapist provided patient with alternate facilities that are further out from [facility]. Patient is concerned to be away from [facility] due to being the care giver to his mother. Therapist and patient discussed going to [facility] as they are close. Patient wanted to reach out to his brother to see how long he would be in town and would contact therapist back for a decision on inpatient or outpatient services.     01:24 Therapist spoke with Vonda RN regarding patient. Therapist was able understand patient is requesting resources and wants to be released as he is not suicidal.      01:27 Therapist spoke with Dr. Sandhu and he agreed that patient is not a danger to himself or anyone else. Patient can be discharged with Resources    01:29 Therapist faxed resources to patient for Mental Health Services and Crisis lines for [facility].    Patient will be discharged    Parker ALFARO  06/12/2023     Parker Loera  06/12/23 0135

## 2023-06-12 VITALS
TEMPERATURE: 98.3 F | RESPIRATION RATE: 18 BRPM | HEART RATE: 75 BPM | BODY MASS INDEX: 25.11 KG/M2 | HEIGHT: 67 IN | SYSTOLIC BLOOD PRESSURE: 140 MMHG | DIASTOLIC BLOOD PRESSURE: 94 MMHG | OXYGEN SATURATION: 100 % | WEIGHT: 160 LBS

## 2023-06-12 NOTE — DISCHARGE INSTRUCTIONS
Follow-up with your PCP.  Use the provided psychiatry resources as I think you would benefit from evaluation.  If you change your mind about inpatient placement our door is always open.  Please return to the ED or seek other medical care for any concerning symptoms.